# Patient Record
Sex: FEMALE | Race: BLACK OR AFRICAN AMERICAN | NOT HISPANIC OR LATINO
[De-identification: names, ages, dates, MRNs, and addresses within clinical notes are randomized per-mention and may not be internally consistent; named-entity substitution may affect disease eponyms.]

---

## 2020-11-18 PROBLEM — Z00.00 ENCOUNTER FOR PREVENTIVE HEALTH EXAMINATION: Status: ACTIVE | Noted: 2020-11-18

## 2020-11-30 ENCOUNTER — APPOINTMENT (OUTPATIENT)
Dept: OTOLARYNGOLOGY | Facility: CLINIC | Age: 25
End: 2020-11-30
Payer: MEDICAID

## 2020-11-30 VITALS
WEIGHT: 148 LBS | HEIGHT: 61 IN | TEMPERATURE: 98.1 F | BODY MASS INDEX: 27.94 KG/M2 | HEART RATE: 80 BPM | DIASTOLIC BLOOD PRESSURE: 84 MMHG | SYSTOLIC BLOOD PRESSURE: 119 MMHG

## 2020-11-30 DIAGNOSIS — R07.0 PAIN IN THROAT: ICD-10-CM

## 2020-11-30 PROCEDURE — 99072 ADDL SUPL MATRL&STAF TM PHE: CPT

## 2020-11-30 PROCEDURE — 99203 OFFICE O/P NEW LOW 30 MIN: CPT

## 2020-11-30 NOTE — PHYSICAL EXAM
[Midline] : trachea located in midline position [Hearing Loss Right Only] : normal [Hearing Loss Left Only] : normal [Normal] : salivary glands are normal

## 2020-11-30 NOTE — HISTORY OF PRESENT ILLNESS
[Otalgia] : otalgia [de-identified] : 24 yo female\par Patient states 1 month ago she got a left sided ear infection took amoxicillin with mild relief. She had on and off ear pain since. Thursday morning she woke up with mild throat pain, had ear pain with throat pain, took Tylenol which helped. Today she is feeling better. Denies smoking hx \par Pt has no ear drainage, hearing loss, tinnitus, vertigo, nasal congestion, nasal discharge, epistaxis, sinus infections, facial pain, facial pressure, dysphagia or fevers\par \par  [Anxiety] : no anxiety [Dizziness] : no dizziness [Headache] : no headache [Hearing Loss] : no hearing loss [Recurrent Otitis Media] : no recurrent otitis media [Otitis Media with Effusion] : no otitis media with effusion [Presbycusis] : no presbycusis [Congenital Ear Malformation] : no congenital ear malformation [Meniere Disease] : no Meniere disease [Otosclerosis] : no otosclerosis [Perilymphatic Fistula] : no perilymphatic fistula [Hypertension] : no hypertension [Loud Noise Exposure] : no history of loud noise exposure [Smoking] : no smoking [Early Onset Hearing Loss] : no early onset hearing loss [Stroke] : no stroke [Facial Pain] : no facial pain [Facial Pressure] : no facial pressure [Nasal Congestion] : no nasal congestion [Diplopia] : no diplopia [Ear Fullness] : no ear fullness [Allergic Rhinitis] : no allergic rhinitis [Maxillary Tooth Infection] : no maxillary tooth infection [Septal Deviation] : no septal deviation [Environmental Allergies] : no environmental allergies [Seasonal Allergies] : no seasonal allergies [Environmental Allergens] : no environmental allergens [Adenoidectomy] : no adenoidectomy [Nasal FB Removal] : no nasal foreign body removal [Allergies] : no allergies [Asthma] : no asthma [Neck Mass] : no neck mass [Neck Pain] : no neck pain [Chills] : no chills [Cold Intolerance] : no cold intolerance [Cough] : no cough [Fatigue] : no fatigue [Heat Intolerance] : no heat intolerance [Hyperthyroidism] : no hyperthyroidism [Sialadenitis] : no sialadenitis [Hodgkin Disease] : no hodgkin disease [Non-Hodgkin Lymphoma] : no non-hodgkin lymphoma [Tobacco Use] : no tobacco use [Alcohol Use] : no alcohol use [Graves Disease] : no graves disease [Thyroid Cancer] : no thyroid cancer

## 2020-11-30 NOTE — ASSESSMENT
[FreeTextEntry1] : Ear and throat pain:\par -now resolved\par No infection noted\par -doing well \par \par f/u prn

## 2021-05-17 ENCOUNTER — APPOINTMENT (OUTPATIENT)
Dept: OTOLARYNGOLOGY | Facility: CLINIC | Age: 26
End: 2021-05-17

## 2021-07-16 ENCOUNTER — NON-APPOINTMENT (OUTPATIENT)
Age: 26
End: 2021-07-16

## 2021-07-23 ENCOUNTER — APPOINTMENT (OUTPATIENT)
Dept: INTERNAL MEDICINE | Facility: CLINIC | Age: 26
End: 2021-07-23
Payer: MEDICAID

## 2021-07-23 ENCOUNTER — NON-APPOINTMENT (OUTPATIENT)
Age: 26
End: 2021-07-23

## 2021-07-23 VITALS — SYSTOLIC BLOOD PRESSURE: 102 MMHG | HEART RATE: 74 BPM | RESPIRATION RATE: 12 BRPM | DIASTOLIC BLOOD PRESSURE: 70 MMHG

## 2021-07-23 DIAGNOSIS — R00.2 PALPITATIONS: ICD-10-CM

## 2021-07-23 DIAGNOSIS — Z78.9 OTHER SPECIFIED HEALTH STATUS: ICD-10-CM

## 2021-07-23 DIAGNOSIS — U07.1 COVID-19: ICD-10-CM

## 2021-07-23 PROCEDURE — 99203 OFFICE O/P NEW LOW 30 MIN: CPT | Mod: 25

## 2021-07-23 NOTE — HISTORY OF PRESENT ILLNESS
[FreeTextEntry1] : [facility] ER x 2 in the last 2 weeks for chest tightness and palpit.  Unvaccinated for COVID.  Had CTA, echo EKG CXR labs all neg.  Saw cardio,  On Holter now.  Saw Pulm.  Got Symbicort.  TO get PFT.  Saw allergist 7/22/21.  Neg eval.  \par On 7/21/21, COVID positive.

## 2021-07-23 NOTE — ASSESSMENT
[FreeTextEntry1] : COVID pos 7/21/21. Unvaccinated.  Note  had it 5/2021.  Pt was tested regularly and well till now.   Had ER x 2 for chest tightness and palpit.  ER eval with CTA neg.  Saw card echo ekg normal.  Wearing holter now.  Saw Pulm.  Got Symbicort.  To have PFTs.  Saw allergist.  W/up neg..

## 2021-07-23 NOTE — REVIEW OF SYSTEMS
[Chest Pain] : chest pain [Palpitations] : palpitations [Shortness Of Breath] : shortness of breath [Cough] : cough [Dyspnea on Exertion] : dyspnea on exertion [Fever] : no fever [Wheezing] : no wheezing

## 2021-07-23 NOTE — HEALTH RISK ASSESSMENT
[Good] : ~his/her~  mood as  good [Yes] : Yes [Monthly or less (1 pt)] : Monthly or less (1 point) [1 or 2 (0 pts)] : 1 or 2 (0 points) [No] : In the past 12 months have you used drugs other than those required for medical reasons? No [0] : 2) Feeling down, depressed, or hopeless: Not at all (0) [PHQ-2 Negative - No further assessment needed] : PHQ-2 Negative - No further assessment needed [] : No [Audit-CScore] : 1 [[id number]] : 0

## 2021-10-07 ENCOUNTER — APPOINTMENT (OUTPATIENT)
Dept: OTOLARYNGOLOGY | Facility: CLINIC | Age: 26
End: 2021-10-07
Payer: MEDICAID

## 2021-10-07 VITALS
HEART RATE: 77 BPM | WEIGHT: 144 LBS | SYSTOLIC BLOOD PRESSURE: 119 MMHG | BODY MASS INDEX: 27.19 KG/M2 | DIASTOLIC BLOOD PRESSURE: 79 MMHG | HEIGHT: 61 IN | TEMPERATURE: 98.3 F

## 2021-10-07 DIAGNOSIS — Z01.10 ENCOUNTER FOR EXAMINATION OF EARS AND HEARING W/OUT ABNORMAL FINDINGS: ICD-10-CM

## 2021-10-07 PROCEDURE — 92567 TYMPANOMETRY: CPT

## 2021-10-07 PROCEDURE — 92557 COMPREHENSIVE HEARING TEST: CPT

## 2021-10-07 PROCEDURE — 99214 OFFICE O/P EST MOD 30 MIN: CPT | Mod: 25

## 2021-10-07 NOTE — HISTORY OF PRESENT ILLNESS
[de-identified] : 27 yo female\par Patient complains of left side ear pain x 2 months. States she had COVID in July and since then her ear has been hurting. Her PCP put her on Amoxicillin. SHe continues to have ear pain, feels full. Also complains of dizziness x 1 week. She woke one morning with a full left ear and then the room started spinning, spins last several seconds but happen several times a day. Today she is feeling okay. Pt has no ear drainage, hearing loss, tinnitus, vertigo, nasal congestion, nasal discharge, epistaxis, sinus infections, facial pain, facial pressure, throat pain, dysphagia or fevers\par \par

## 2021-10-07 NOTE — ASSESSMENT
[FreeTextEntry1] : Patient complains of left sided ear fullness and dizziness \par \par Vertigo r/o Leftr Endolymphatic hydrops\par -Hearing Test performed to evaluate the extent of hearing loss and to explain pt's symptoms\par -VNG/ECOG ordered \par low salt diet\par \par TMJ\par DDS eval \par CBD oil to affectwed area\par \par f/u prn and aftyer data collection

## 2021-10-07 NOTE — END OF VISIT
[FreeTextEntry3] : I personally saw and examined VAZQUEZ ALVAREZ in detail. I spoke to RUTH Givesn regarding the assessment and plan of care. I reviewed the above assessment and plan of care, and agree. I have made changes in changes in the body of the note where appropriate.I personally reviewed the HPI, PMH, FH, SH, ROS and medications/allergies. I have spoken to RUTH Givens regarding the history and have personally determined the assessment and plan of care, and documented this myself. I was present and participated in all key portions of the encounter and all procedures noted above. I have made changes in the body of the note where appropriate.\par \par Attesting Faculty: See Attending Signature Below \par \par \par  [Time Spent: ___ minutes] : I have spent [unfilled] minutes of time on the encounter.

## 2021-10-07 NOTE — PHYSICAL EXAM
[Midline] : trachea located in midline position [Normal] : gait was normal [de-identified] : b/l tenderness  [Hearing Loss Right Only] : normal [Hearing Loss Left Only] : normal [Nystagmus] : ~T no ~M nystagmus was seen [Fukuda Step Test] : Fukuda Step Test was Negative [Je-[place]ver[place]ake] : Beverly-Hallpike: Negative

## 2021-10-21 ENCOUNTER — APPOINTMENT (OUTPATIENT)
Dept: OTOLARYNGOLOGY | Facility: CLINIC | Age: 26
End: 2021-10-21
Payer: MEDICAID

## 2021-10-21 PROCEDURE — ZZZZZ: CPT

## 2021-10-21 PROCEDURE — 92540 BASIC VESTIBULAR EVALUATION: CPT

## 2021-10-21 PROCEDURE — 92567 TYMPANOMETRY: CPT

## 2021-10-21 PROCEDURE — 92547 SUPPLEMENTAL ELECTRICAL TEST: CPT

## 2021-10-21 PROCEDURE — 92537 CALORIC VSTBLR TEST W/REC: CPT

## 2021-10-21 PROCEDURE — 92584 ELECTROCOCHLEOGRAPHY: CPT

## 2021-10-27 ENCOUNTER — NON-APPOINTMENT (OUTPATIENT)
Age: 26
End: 2021-10-27

## 2022-03-23 ENCOUNTER — APPOINTMENT (OUTPATIENT)
Dept: OTOLARYNGOLOGY | Facility: CLINIC | Age: 27
End: 2022-03-23

## 2023-08-02 ENCOUNTER — APPOINTMENT (OUTPATIENT)
Dept: OTOLARYNGOLOGY | Facility: CLINIC | Age: 28
End: 2023-08-02
Payer: MEDICAID

## 2023-08-02 VITALS
HEART RATE: 76 BPM | HEIGHT: 61 IN | TEMPERATURE: 98 F | WEIGHT: 177 LBS | SYSTOLIC BLOOD PRESSURE: 117 MMHG | DIASTOLIC BLOOD PRESSURE: 78 MMHG | BODY MASS INDEX: 33.42 KG/M2

## 2023-08-02 DIAGNOSIS — R42 DIZZINESS AND GIDDINESS: ICD-10-CM

## 2023-08-02 PROCEDURE — 31231 NASAL ENDOSCOPY DX: CPT

## 2023-08-02 PROCEDURE — 92557 COMPREHENSIVE HEARING TEST: CPT

## 2023-08-02 PROCEDURE — 92567 TYMPANOMETRY: CPT

## 2023-08-02 PROCEDURE — 99213 OFFICE O/P EST LOW 20 MIN: CPT | Mod: 25

## 2023-08-02 NOTE — PROCEDURE
[FreeTextEntry6] : Flexible scope #38 was used. Right nasal passage with hypertrophy of inferior, middle and superior turbinates. Nasal passage patent with clear middle meatus and sphenoethmoid recess. Left nasal passage with hypertrophy of inferior, middle and superior turbinates. Nasal passage was patent with clear middle meatus and sphenoethmoid recess. No mucopurulence or polyps appreciated. Nasopharynx clear. Left DNS with BITH

## 2023-08-02 NOTE — REASON FOR VISIT
[Subsequent Evaluation] : a subsequent evaluation for [FreeTextEntry2] : dizziness and left sided ear clogging

## 2023-08-02 NOTE — ASSESSMENT
[FreeTextEntry1] : Ms. MICHALE ALVAREZ 28 year F complains of dizziness with left sided ear fullness/ clogging and intermittent tinnitus x 1 month. She is using Azelastine for nasal congestion which gives her some relief. Currently pregnant.   Dizziness/ Tinnitus Left sided possible Hydrops.  - ECOG and VNG normal  - if tinnitus recurs and is persistent can consider MRI IAC with and without contrast but will hold off in light of her current pregnancy -Hearing Test performed to evaluate the extent of hearing loss and to explain pt's symptoms -WNL - Continue with low salt diet  - If dizziness gets worse will start her on a steroid, will hold off for now due to pregnancy   Left DNS w/ BITH: - Add saline rinse BID and use hypertonic to help with congestion while pregnant  f/u prn

## 2023-08-02 NOTE — HISTORY OF PRESENT ILLNESS
[de-identified] : Patient with hx of vertigo complains dizziness worsened over the past month, she feels a fullness and clogging in her left ear. Getting intermittent ringing in the left ear as well. Also feels congested on the left side of her nose, using Azelastine which is giving her some relief. She is watching her salt intake.  Pt is pregnant

## 2023-08-02 NOTE — END OF VISIT
[FreeTextEntry3] : I personally saw and examined Ms. VAZQUEZ ALVAREZ  in detail this visit today. I personally reviewed the HPI, PMH, FH, SH, ROS and medications/allergies. I have spoken to RUTH Dumont regarding the history and have personally determined the assessment and plan of care, and documented this myself. I was present and participated in all key portions of the encounter and all procedures noted above. I have made changes in the body of the note where appropriate.  Attesting Faculty: See Attending Signature Below

## 2023-09-27 ENCOUNTER — APPOINTMENT (OUTPATIENT)
Dept: OTOLARYNGOLOGY | Facility: CLINIC | Age: 28
End: 2023-09-27
Payer: MEDICAID

## 2023-09-27 VITALS
DIASTOLIC BLOOD PRESSURE: 91 MMHG | WEIGHT: 170 LBS | HEIGHT: 61 IN | TEMPERATURE: 98 F | HEART RATE: 107 BPM | BODY MASS INDEX: 32.1 KG/M2 | SYSTOLIC BLOOD PRESSURE: 123 MMHG

## 2023-09-27 DIAGNOSIS — H81.92 UNSPECIFIED DISORDER OF VESTIBULAR FUNCTION, LEFT EAR: ICD-10-CM

## 2023-09-27 DIAGNOSIS — M26.609 UNSPECIFIED TEMPOROMANDIBULAR JOINT DISORDER: ICD-10-CM

## 2023-09-27 DIAGNOSIS — H92.02 OTALGIA, LEFT EAR: ICD-10-CM

## 2023-09-27 DIAGNOSIS — H93.12 TINNITUS, LEFT EAR: ICD-10-CM

## 2023-09-27 PROCEDURE — 99214 OFFICE O/P EST MOD 30 MIN: CPT
